# Patient Record
Sex: MALE | Race: WHITE | URBAN - METROPOLITAN AREA
[De-identification: names, ages, dates, MRNs, and addresses within clinical notes are randomized per-mention and may not be internally consistent; named-entity substitution may affect disease eponyms.]

---

## 2017-12-07 ENCOUNTER — HISTORICAL (OUTPATIENT)
Dept: CARDIOLOGY | Facility: HOSPITAL | Age: 62
End: 2017-12-07

## 2018-12-14 ENCOUNTER — HISTORICAL (OUTPATIENT)
Dept: LAB | Facility: HOSPITAL | Age: 63
End: 2018-12-14

## 2018-12-14 LAB
ABS NEUT (OLG): 4.6 X10(3)/MCL (ref 2.1–9.2)
APTT PPP: 28.3 SECOND(S) (ref 24.8–36.9)
ERYTHROCYTE [DISTWIDTH] IN BLOOD BY AUTOMATED COUNT: 12.3 % (ref 11.5–17)
HCT VFR BLD AUTO: 46.2 % (ref 42–52)
HGB BLD-MCNC: 16 GM/DL (ref 14–18)
INR PPP: 1.1 (ref 0–1.3)
MCH RBC QN AUTO: 31.3 PG (ref 27–31)
MCHC RBC AUTO-ENTMCNC: 34.6 GM/DL (ref 33–36)
MCV RBC AUTO: 90.4 FL (ref 80–94)
PLATELET # BLD AUTO: 189 X10(3)/MCL (ref 130–400)
PMV BLD AUTO: 9.8 FL (ref 7.4–10.4)
PROTHROMBIN TIME: 14.1 SECOND(S) (ref 12.2–14.7)
RBC # BLD AUTO: 5.11 X10(6)/MCL (ref 4.7–6.1)
WBC # SPEC AUTO: 7.9 X10(3)/MCL (ref 4.5–11.5)

## 2018-12-18 ENCOUNTER — HISTORICAL (OUTPATIENT)
Dept: CARDIOLOGY | Facility: HOSPITAL | Age: 63
End: 2018-12-18

## 2020-01-02 LAB
INFLUENZA A ANTIGEN, POC: NEGATIVE
INFLUENZA B ANTIGEN, POC: NEGATIVE

## 2020-01-07 LAB — RAPID GROUP A STREP (OHS): NEGATIVE

## 2021-01-04 ENCOUNTER — HISTORICAL (OUTPATIENT)
Dept: CARDIOLOGY | Facility: HOSPITAL | Age: 66
End: 2021-01-04

## 2021-01-04 LAB
ABS NEUT (OLG): 4.42 X10(3)/MCL (ref 2.1–9.2)
ALBUMIN SERPL-MCNC: 4.1 GM/DL (ref 3.4–4.8)
ALBUMIN/GLOB SERPL: 1.9 RATIO (ref 1.1–2)
ALP SERPL-CCNC: 62 UNIT/L (ref 40–150)
ALT SERPL-CCNC: 16 UNIT/L (ref 0–55)
APTT PPP: 28.5 SECOND(S) (ref 23.2–33.7)
AST SERPL-CCNC: 21 UNIT/L (ref 5–34)
BASOPHILS # BLD AUTO: 0.1 X10(3)/MCL (ref 0–0.2)
BASOPHILS NFR BLD AUTO: 1 %
BILIRUB SERPL-MCNC: 0.6 MG/DL
BILIRUBIN DIRECT+TOT PNL SERPL-MCNC: 0.2 MG/DL (ref 0–0.5)
BILIRUBIN DIRECT+TOT PNL SERPL-MCNC: 0.4 MG/DL (ref 0–0.8)
BUN SERPL-MCNC: 11.8 MG/DL (ref 8.4–25.7)
CALCIUM SERPL-MCNC: 8.9 MG/DL (ref 8.8–10)
CHLORIDE SERPL-SCNC: 100 MMOL/L (ref 98–107)
CO2 SERPL-SCNC: 29 MMOL/L (ref 23–31)
CREAT SERPL-MCNC: 0.94 MG/DL (ref 0.73–1.18)
EOSINOPHIL # BLD AUTO: 0.3 X10(3)/MCL (ref 0–0.9)
EOSINOPHIL NFR BLD AUTO: 5 %
ERYTHROCYTE [DISTWIDTH] IN BLOOD BY AUTOMATED COUNT: 11.9 % (ref 11.5–17)
GLOBULIN SER-MCNC: 2.2 GM/DL (ref 2.4–3.5)
GLUCOSE SERPL-MCNC: 90 MG/DL (ref 82–115)
HCT VFR BLD AUTO: 43.4 % (ref 42–52)
HGB BLD-MCNC: 14.5 GM/DL (ref 14–18)
INR PPP: 1.1 (ref 0–1.3)
LYMPHOCYTES # BLD AUTO: 2 X10(3)/MCL (ref 0.6–4.6)
LYMPHOCYTES NFR BLD AUTO: 28 %
MAGNESIUM SERPL-MCNC: 1.9 MG/DL (ref 1.6–2.6)
MCH RBC QN AUTO: 31 PG (ref 27–31)
MCHC RBC AUTO-ENTMCNC: 33.4 GM/DL (ref 33–36)
MCV RBC AUTO: 92.9 FL (ref 80–94)
MONOCYTES # BLD AUTO: 0.5 X10(3)/MCL (ref 0.1–1.3)
MONOCYTES NFR BLD AUTO: 7 %
NEUTROPHILS # BLD AUTO: 4.42 X10(3)/MCL (ref 2.1–9.2)
NEUTROPHILS NFR BLD AUTO: 60 %
PLATELET # BLD AUTO: 166 X10(3)/MCL (ref 130–400)
PMV BLD AUTO: 10.7 FL (ref 9.4–12.4)
POC TROPONIN: 0 NG/ML (ref 0–0.08)
POTASSIUM SERPL-SCNC: 3.9 MMOL/L (ref 3.5–5.1)
PROT SERPL-MCNC: 6.3 GM/DL (ref 5.8–7.6)
PROTHROMBIN TIME: 13.5 SECOND(S) (ref 11.1–13.7)
RBC # BLD AUTO: 4.67 X10(6)/MCL (ref 4.7–6.1)
SODIUM SERPL-SCNC: 140 MMOL/L (ref 136–145)
TROPONIN I SERPL-MCNC: <0.01 NG/ML (ref 0–0.04)
WBC # SPEC AUTO: 7.4 X10(3)/MCL (ref 4.5–11.5)

## 2021-08-17 LAB
BUN SERPL-MCNC: 25.4 MG/DL (ref 8.4–25.7)
CALCIUM SERPL-MCNC: 9.6 MG/DL (ref 8.8–10)
CHLORIDE SERPL-SCNC: 103 MMOL/L (ref 98–107)
CO2 SERPL-SCNC: 27 MMOL/L (ref 23–31)
CREAT SERPL-MCNC: 1.15 MG/DL (ref 0.73–1.18)
CREAT/UREA NIT SERPL: 22
ERYTHROCYTE [DISTWIDTH] IN BLOOD BY AUTOMATED COUNT: 12 % (ref 11.5–17)
GLUCOSE SERPL-MCNC: 86 MG/DL (ref 82–115)
HCT VFR BLD AUTO: 42.1 % (ref 42–52)
HGB BLD-MCNC: 14.4 GM/DL (ref 14–18)
MCH RBC QN AUTO: 31 PG (ref 27–31)
MCHC RBC AUTO-ENTMCNC: 34.2 GM/DL (ref 33–36)
MCV RBC AUTO: 90.7 FL (ref 80–94)
PLATELET # BLD AUTO: 167 X10(3)/MCL (ref 130–400)
PMV BLD AUTO: 11.1 FL (ref 9.4–12.4)
POTASSIUM SERPL-SCNC: 4.4 MMOL/L (ref 3.5–5.1)
RBC # BLD AUTO: 4.64 X10(6)/MCL (ref 4.7–6.1)
SODIUM SERPL-SCNC: 140 MMOL/L (ref 136–145)
WBC # SPEC AUTO: 6.3 X10(3)/MCL (ref 4.5–11.5)

## 2021-08-25 LAB — SARS-COV-2 AG RESP QL IA.RAPID: NEGATIVE

## 2021-08-26 ENCOUNTER — HISTORICAL (OUTPATIENT)
Dept: CARDIOLOGY | Facility: HOSPITAL | Age: 66
End: 2021-08-26

## 2021-08-26 LAB
EST CREAT CLEARANCE SER (OHS): 56.5 ML/MIN
INR PPP: 1.1 (ref 0–1.3)
MAGNESIUM SERPL-MCNC: 2.1 MG/DL (ref 1.6–2.6)
PROTHROMBIN TIME: 13.8 SECOND(S) (ref 12.5–14.5)

## 2022-04-10 ENCOUNTER — HISTORICAL (OUTPATIENT)
Dept: ADMINISTRATIVE | Facility: HOSPITAL | Age: 67
End: 2022-04-10

## 2022-04-29 VITALS
BODY MASS INDEX: 22.5 KG/M2 | WEIGHT: 140 LBS | SYSTOLIC BLOOD PRESSURE: 107 MMHG | HEIGHT: 66 IN | OXYGEN SATURATION: 96 % | DIASTOLIC BLOOD PRESSURE: 73 MMHG

## 2022-04-30 NOTE — OP NOTE
Patient:   Pavel Norman            MRN: [id number]            FIN: [id number]               Age:   62 years     Sex:  Male     :  1955   Associated Diagnoses:   None   Author:   Ananth Be MD      Operative Note   Operative Information   Date/ Time:  2017 09:21:00.     Procedures Performed:   1.  Comprehensive EP study with induction of arrhythmia  2.  Left atrial pacing and recording from coronary sinus catheter  3.  Programmed stimulation and pacing after IV drug infusion  4.  Ablation of the cava tricuspid isthmus for typical atrial flutter  5.  3-D mapping  .     Indications:   62-year-old white male with recurrent atrial flutter on antiarrhythmic therapy who has also been found to have sinus node dysfunction.  Patient is undergoing attempted ablation for definitive therapy in order to attempt to avoid antiarrhythmic medications.  .     Preoperative Diagnosis:   1.  Typical atrial flutter  2.  Left bundle-branch block  3.  Sinus node dysfunction  .     Postoperative Diagnosis:   1.  Typical atrial flutter  2.  Left bundle-branch block  3.  Sinus node dysfunction  .     Surgeon: Ananth Be MD.     Esimated blood loss: loss greater than  0  cc, loss less than  10  cc.     Description of Procedure/Findings/    Complications:   Summary of procedure:  After risks, benefits, and alternatives were explained to the patient, informed consent was obtained.  The patient was brought to the EP lab in a fasting and nonsedated state.  Sedation for the procedure was accomplished by the anesthesia team.  The bilateral groins were prepped and draped in usual sterile fashion.  Using 1% lidocaine the right groin was anesthetized.  Using the modified Seldinger technique access was obtained to the right femoral vein on 3 separate occasions where 2 7 Georgian and one 6 Georgian sheath were placed.  I then advanced a SeeJay decapolar CS catheter through the 1st 7 Georgian sheath to the heart and into the  coronary sinus for left atrial pacing and recording.  I then advanced a Halo catheter and a CRD 2 catheter through the 7 Czech and 6 Czech sheaths respectively.  The Halo catheter was placed in the right atrium along the doug terminalis for right atrial pacing and recording.  The CRD 2 catheter was placed in the his bundle region for His bundle recording.    Baseline measurements were obtained.  I then proceeded with the atrial study where we were performing decremental pacing.  With decremental pacing the patient did initiate into a counterclockwise activating flutter with concentric activation of the left atrium.  Baseline measurements of atrial flutter were measured.  I then proceeded with entrainment from the distal Halo catheter and the proximal coronary sinus.  There was concealed entrainment with pacing from both locations however the post-pacing interval was slightly prolonged.  I also performed pacing from the distal CS catheter which demonstrated manifest entrainment and with termination of pacing the post-pacing interval was clearly outside of the tachycardia circuit.  I then mapped the right atrium using the Halo catheter and created a propagation map which was consistent with likely typical atrial flutter.    The CRD 2 catheter was removed from the body and the 6 Czech sheath was exchanged for an SRO sheath.  I then advanced a [place] flexibility 4 mm tip F-J curve irrigated ablation catheter through the SRO sheath.  The ablation catheter was then placed in the distal aspect of the cava tricuspid isthmus and I performed drag line ablation lesions through the cava tricuspid isthmus.  When we're at the proximal to mid aspect of the cava tricuspid isthmus there was termination of the atrial flutter.  I then performed pacing from the proximal point sinus which demonstrated delayed but continued conduction through the cava tricuspid isthmus.  Further ablation was performed in the area where we had  terminated the flutter and the line was completed to the inferior vena cava.  When ablating slightly more proximal to the area where we had termination of the flutter we did achieve unidirectional block.  I then performed pacing from the distal Halo catheter and confirmed bidirectional block.  I then repositioned the ablation catheter distally on this previous line and performed ablation in areas where there were continued adequate atrial signals.  I also performed a consolidating lesion where we achieved block.    The ablation catheter was then exchanged for the CRD 2 catheter, which was placed back in the his bundle region.  Post-ablation measurements were then obtained.  I then performed the post-ablation atrial study with burst pacing, decremental pacing, and atrial extra stimuli.  After 15 minutes from a last ablation lesion the patient was given 12 mg of IV adenosine to hyper-depolarize the atrial tissue and evaluate for reconnection of conductionthrough the isthmus.  There is no evidence of reconnection with pacing from the proximal coronary sinus and distal Halo.  The patient maintained bidirectional block.    At this point I decided to terminate the procedure.  The catheters were removed from the body and the SRO sheath was exchanged for a short 9 Singaporean sheath.  The sheath were flushed.  The patient will be transferred to the post anesthesia care unit where the sheaths will be removed and hemostasis will be obtained with manual compression.    3-D mapping was used through the procedure to sena location of the catheters, the ablation lesions, create anatomy of the right atrium and cava tricuspid isthmus and to create a propagation map.    Baseline measurements:  Sinus node: Sinus cycle length was 886 ms  AV node: WI interval was 211 ms, AH interval was 110 ms, the HV interval was 75 ms  Ventricle: The QRS duration was 125 ms with a left bundle-branch block morphology and the QT interval was 450 ms    Atrial  flutter:  The atrial flutter had counterclockwise activation of the right atrium and concentric activation of the left atrium with a cycle length of 450 ms.  When we performed entrainment from the proximal coronary sinus as well as the distal Halo catheter there was concealed entrainment and with termination of pacing the post-pacing interval was 510 ms.  When I performed entrainment from the distal CS catheter there was manifest entrainment and with termination of pacing the post-pacing interval was 690 ms.    Ablation:  I performed 3 ablation lesions foretold time of 7 minutes and 30 seconds.  The average temperature was 26°C and the average power was 29 Harmon.    Post-ablation study:  Baseline measurements:  Sinus node: The sinus cycle length was 935 ms  AV node: The HI interval was 200 ms, the AH interval was 111 ms, and HV interval was 72 ms  Ventricle: The QRS duration was 125 ms with a left bundle-branch block morphology and the QT interval was 476 ms    Atrial study: The AV block cycle length was 370 ms and the atrial ERP was 270 ms after drive train of 600 ms.    Trans-isthmus conduction: The post-ablation trans-isthmus conduction time with pacing from the proximal coronary sinus to the distal Halo catheter was 232 ms and with pacing from the distal Halo catheter to the proximal coronary sinus was 235 ms.  There was positive differential pacing as well as one I paced from Halo catheter 3, 4 the trans-isthmus conduction time to the proximal coronary sinus was 212 ms.    Impression:  1.  Typical atrial flutter  2.  Successful ablation of the cava tricuspid isthmus with termination of atrial flutter and achievement of bidirectional block  3.  Left bundle-branch block with prolonged H HV interval    Plan:  I am going to discontinue flecainide therapy.  We will monitor patient for any competitions from the procedure and he will be discharged home later on today.  I am going to continue Toprol therapy for now.   Patient will follow-up with me as precede discussed and we will arrange Holter monitoring at follow-up..     Complications: None.

## 2022-04-30 NOTE — ED PROVIDER NOTES
Patient:   Pavel Norman            MRN: [id number]            FIN: [id number]               Age:   65 years     Sex:  Male     :  1955   Associated Diagnoses:   Arrhythmia; Bradycardia   Author:   Jazmyn Araujo MD, Nolan BUCHANAN      Basic Information   Time seen: Date & time 2021 11:40:00.   History source: Patient.   Arrival mode: Private vehicle.   Additional information: Patient's physician(s): Reyna Barth MD, Chief Complaint from Nursing Triage Note : Chief Complaint   2021 11:30 CST       Chief Complaint           pt states he has loop recorder and [facility] instructe him to come to ED. Had 13 second pause on loop recorder. reports low BP and HR in 130s on saturday. Denies dizziness, cp, sob.  .      History of Present Illness   The patient presents with   65 year old male presents to ED for loop recorder problem-reports having 13 second pause yesterday with low blood pressure and elevated heart rate; Was told to report to ED by [facility]; Denies dizziness, chest pain, shortness of breath - TRACY Garcia  and I, Dr. Eldridge, assumed care of this patient at 1208.     66 y/o CM with hx of AF and a loop recorder presents to the ED after being called by [facility] for episode of asystole on Saturday. PT reports that they received the reading that showed asystole for 13 seconds. PT reports reports having AF and hypotension on Saturday with exertion, therefore he rested. He denies having any symptoms currently. .  The onset was 2  days ago.  Associated symptoms: none.  Risk factors consist of atrial fibrillation.  Prior episodes: none.  Therapy today: none.  Additional history: none.        Review of Systems   Constitutional symptoms:  No fever, no chills, no sweats, no weakness   Skin symptoms:  No rash   Eye symptoms:  No recent vision problems   ENMT symptoms:  No ear pain   Respiratory symptoms:  No shortness of breath, no orthopnea   Cardiovascular symptoms:  No chest pain, no palpitations    Gastrointestinal symptoms:  No abdominal pain, no nausea, no vomiting, no diarrhea   Genitourinary symptoms:  No dysuria, no hematuria.    Musculoskeletal symptoms:  No back pain, no Muscle pain   Psychiatric symptoms:  No anxiety, no depression.    Allergy/immunologic symptoms:  No seasonal allergies, no food allergies             Additional review of systems information: All other systems reviewed and otherwise negative.      Health Status   Allergies:    Allergic Reactions (Selected)  No Known Allergies,    Allergies (1) Active Reaction  No Known Allergies None Documented  .   Medications:  (Selected)   Documented Medications  Documented  LISINOPRIL 20 MG TABLET: 20 mg = 1 tab(s), Oral, Daily  Synthroid 100 mcg (0.1 mg) oral tablet: 100 mcg = 1 tab(s), Oral, Daily, 0 Refill(s)  aspirin 325 mg oral tablet: 325 mg = 1 tab(s), Oral, Daily  metoprolol succinate 25 mg oral tablet extended release: 12.5 mg = 0.5 tab(s), Oral, qPM.      Past Medical/ Family/ Social History   Medical history:    Active  arrythmia (5301303669)  Palpitations (692923551)  pre syncope (3582837321)  Thyroid disease (318687172)  AF - Atrial fibrillation (7003046631)  Resolved  Colon polyps ([id number]):  Resolved.  Comments:  5/8/2014 CDT 11:06 CDT - Mitch BOWEN, Martina kaufman  Atrial flutter (6302827):  Resolved.  HTN - Hypertension (7301788043):  Resolved..   Surgical history:    left inguinal hernia repair.  finger surgeries.  Kidney stone (739609513).  Vasectomy (63668523)..   Family history:    No family history items have been selected or recorded..   Social history: Alcohol use: Denies, Tobacco use: Denies.      Physical Examination               Vital Signs   Vital Signs   1/4/2021 11:30 CST       Temperature Oral          36.7 DegC                             Temperature Oral (calculated)             98.06 DegF                             Peripheral Pulse Rate     54 bpm  LOW                              Respiratory Rate          18 br/min                             SpO2                      100 %                             Oxygen Therapy            Room air                             Systolic Blood Pressure   169 mmHg  HI                             Diastolic Blood Pressure  97 mmHg  HI  .      Vital Signs (last 24 hrs)_____  Last Charted___________  Temp Oral     36.7 DegC  (JAN 04 11:30)  Heart Rate Peripheral   L 54bpm  (JAN 04 11:30)  Resp Rate         18 br/min  (JAN 04 11:30)  SBP      H 169mmHg  (JAN 04 11:30)  DBP      H 97mmHg  (JAN 04 11:30)  SpO2      100 %  (JAN 04 11:30)  .   Measurements   1/4/2021 11:30 CST       Weight Dosing             61.5 kg                             Weight Measured and Calculated in Lbs     135.58 lb                             Weight Estimated          61.5 kg                             Height/Length Dosing      167 cm                             Height/Length Estimated   167 cm                             Body Mass Index Estimated 22.05 kg/m2  .   Basic Oxygen Information   1/4/2021 11:30 CST       SpO2                      100 %                             Oxygen Therapy            Room air  .   General:  Alert, no acute distress   Neurological:  Alert and oriented to person, place, time, and situation, No focal neurological deficit observed, CN II-XII intact, normal sensory observed, normal motor observed, normal speech observed   Skin:  Warm, pink, intact, moist, no rash   Head:  Normocephalic, atraumatic   Cardiovascular:  No murmur, Normal peripheral perfusion, No edema, Bradycardia   Respiratory:  Lungs are clear to auscultation, respirations are non-labored, breath sounds are equal, Symmetrical chest wall expansion.    Musculoskeletal:  Normal ROM, normal strength   Gastrointestinal:  Soft, Nontender, Non distended, Normal bowel sounds   , No CVA tenderness. Lymphatics:  No lymphadenopathy.   Psychiatric:  Cooperative, appropriate mood & affect, normal judgment       Medical Decision Making   Rationale:  65-year-old male being closely followed by [facility] for arrhythmias.  Patient had a loop recorder on and had an episode occur early  morning.  Patient was notified this morning to come to the ER immediately.  Patient is currently asymptomatic.  According to [facility] he had a 13-second pause of his pulse therefore they are taking him to the Cath Lab at this time for a pacemaker..   Documents reviewed:  Emergency department nurses' notes.   Orders  Launch Order Profile (Selected)   Inpatient Orders  Ordered  Echocardiogram Limited Adult: 21 12:22:00 CST, Stat, arrhythmia, Stop date 21 12:22:00 CST, Patient Bed, Patient has IV, Standard Precautions  Ordered (Collected)  POC iSTAT ER Troponin request: BLOOD, STAT collect, Collected, 21 11:47:45 CST, Stop date 21 11:47:00 CST, Lab Collect, Print Label By Order Location  Ordered (In-Lab)  CMP: STAT collect, 21 11:47:45 CST, BLOOD, Collected, Stop date 21 11:47:00 CST, Lab Collect  Mg Level: STAT collect, 21 11:47:45 CST, BLOOD, Collected, Stop date 21 11:47:00 CST, Lab Collect  PT (Protime): STAT collect, 21 11:47:45 CST, BLOOD, Collected, Stop date 21 11:47:00 CST, Lab Collect  PTT: STAT collect, 21 11:47:45 CST, BLOOD, Collected, Stop date 21 11:47:00 CST, Lab Collect  Troponin-I: STAT collect, 21 11:47:45 CST, BLOOD, Collected, Stop date 21 11:47:00 CST, Lab Collect  Completed  Automated Diff: NOW collect, 21 11:47:00 CST, Blood, Collected, Stop date 21 11:47:00 CST, Lab Collect, Print Label By Order Location, 21 11:41:00 CST  CBC w/ Auto Diff: NOW collect, 21 11:47:45 CST, BLOOD, Collected, Stop date 21 11:47:00 CST, Lab Collect  EK21 11:41:00 CST, Stat, Once, 21 11:41:00 CST, Patient Bed, Patient Has IV, Standard Precautions, -1, -1, 21 11:41:00 CST  POC Istat ER Troponin: Blood, Stat collect,  Collected, 01/04/21 12:08:42 CST  XR Chest 2 Views: Stat, 01/04/21 11:41:00 CST, Chest Pain, None, Patient Bed, Patient Has IV?, Rad Type, Not Scheduled, 01/04/21 11:41:00 CST.   Electrocardiogram:  Time 1/4/2021 11:30:00, rate 46, No ST-T changes, no ectopy, normal LA & QRS intervals, EP Interp, The Rhythm is sinus bradycardia.  , septal infarct, age undetermined.    Results review:  Lab results : Lab View   1/4/2021 12:29 CST       Est Creat Clearance Ser   70.06 mL/min    1/4/2021 12:08 CST       POC Troponin              0.00 ng/mL    1/4/2021 11:47 CST       Sodium Lvl                140 mmol/L                             Potassium Lvl             3.9 mmol/L                             Chloride                  100 mmol/L                             CO2                       29 mmol/L                             Calcium Lvl               8.9 mg/dL                             Magnesium Lvl             1.90 mg/dL                             Glucose Lvl               90 mg/dL                             BUN                       11.8 mg/dL                             Creatinine                0.94 mg/dL                             eGFR-AA                   >60  NA                             eGFR-CHALO                  >60 mL/min/1.73 m2  NA                             Bili Total                0.6 mg/dL                             Bili Direct               0.2 mg/dL                             Bili Indirect             0.40 mg/dL                             AST                       21 unit/L                             ALT                       16 unit/L                             Alk Phos                  62 unit/L                             Total Protein             6.3 gm/dL                             Albumin Lvl               4.1 gm/dL                             Globulin                  2.2 gm/dL  LOW                             A/G Ratio                 1.9 ratio                             Troponin-I                 <0.010 ng/mL                             PT                        13.5 second(s)                             INR                       1.1                             PTT                       28.5 second(s)                             WBC                       7.4 x10(3)/mcL                             RBC                       4.67 x10(6)/mcL  LOW                             Hgb                       14.5 gm/dL                             Hct                       43.4 %                             Platelet                  166 x10(3)/mcL                             MCV                       92.9 fL                             MCH                       31.0 pg                             MCHC                      33.4 gm/dL                             RDW                       11.9 %                             MPV                       10.7 fL                             Abs Neut                  4.42 x10(3)/mcL                             Neutro Auto               60 %  NA                             Lymph Auto                28 %  NA                             Mono Auto                 7 %  NA                             Eos Auto                  5 %  NA                             Abs Eos                   0.3 x10(3)/mcL                             Basophil Auto             1 %  NA                             Abs Neutro                4.42 x10(3)/mcL                             Abs Lymph                 2.0 x10(3)/mcL                             Abs Mono                  0.5 x10(3)/mcL                             Abs Baso                  0.1 x10(3)/mcL  ,    Labs (Last four charted values)  WBC                  7.4 (JAN 04)   Hgb                  14.5 (JAN 04)   Hct                  43.4 (JAN 04)   Plt                  166 (JAN 04)   Na                   140 (JAN 04)   K                    3.9 (JAN 04)   CO2                  29 (JAN 04)   Cl                   100 (JAN 04)   Cr                    0.94 (JAN 04)   BUN                  11.8 (JAN 04)   Glucose Random       90 (JAN 04)   PT                   13.5 (JAN 04)   INR                  1.1 (JAN 04)   PTT                  28.5 (JAN 04) .    Radiology results:  Rad Results (ST)  < 12 hrs   Accession: [id number]  Order: XR Chest 2 Views  Report Dt/Tm: 01/04/2021 12:01  Report:      CHEST X-RAYS (2 Views):     CPT: 18915     HISTORY:  Chest Pain     FINDINGS:  Examination reveals mediastinal and cardiac silhouettes to be within  normal limits. Lung fields are clear and free of gross infiltrates  atelectases or effusions.     Loop recorder is identified     IMPRESSION: No active pulmonary disease    .      Reexamination/ Reevaluation   Vital signs   Basic Oxygen Information   1/4/2021 11:30 CST       SpO2                      100 %                             Oxygen Therapy            Room air        Impression and Plan   Diagnosis   Arrhythmia (UOC30-RY I49.9)   Bradycardia (JXH86-EH R00.1)      Calls-Consults   -  1/4/2021 12:12:00 , Dago Silva DO, consult, Consulted in ED. Will text the group for more information..    Plan   Condition: Guarded.    Disposition: Admit time  1/4/2021 12:59:00, Admit to Inpatient Unit, Patient care transitioned to: Dago Silva DO    Counseled: Patient, Regarding diagnosis, Regarding diagnostic results, Regarding treatment plan, Patient indicated understanding of instructions.    Notes: Julisa LUONG, acted solely as a scribe for and in the presence of Dr. Eldridge who performed the service. , Nolan LUONG, personally performed H&P, PE, and MDM and cofirmed the accuracy of the transcribed note..

## 2022-04-30 NOTE — OP NOTE
Patient:   Pavel Norman            MRN: [id number]            FIN: [id number]               Age:   66 years     Sex:  Male     :  1955   Associated Diagnoses:   None   Author:   Ananth Be MD      Operative Note   Operative Information   Date/ Time:  2021 10:54:00.     Procedures Performed:   1. Comprehensive EP study with attempted induction of arrhythmia  2. Left atrial pacing and recording from coronary sinus catheter  3. Pulmonary vein isolation with cryoballoon ablation  4. 3D mapping  5. Intracardiac echo  6. Transseptal puncture  7. Ultrasound-guided central venous access  8. Venous access closure with Vascade MVP devices.     Indications:   66-year-old white male with paroxysmal atrial fibrillation who is undergoing pulmonary vein isolation for attempted definitive therapy.  Morning.     Preoperative Diagnosis:   Paroxysmal atrial fibrillation  .     Postoperative Diagnosis:   Paroxysmal atrial fibrillation  .     Surgeon: Ananth Be MD.     Esimated blood loss: loss less than  15  cc.     Description of Procedure/Findings/    Complications:   Summary of procedure:  After risks, benefits, and alternatives were explained the patient, informed consent was obtained.  The patient brought to the EP lab in a fasting and nonsedated state.  Sedation for the procedure was accomplished by the anesthesia team.  The bilateral groins were prepped and draped in usual sterile fashion.  Using 1% lidocaine the bilateral groins were anesthetized.  Using ultrasound guidance and the modified Seldinger technique, access obtained to the right femoral vein on 2 separate occasions and the left femoral vein on 2 separate occasions.  I placed a SLO sheath and 7 [place]i sheath into the right femoral vein as well as a 7 [place]i sheath and long 10 [place]i sheath into the left femoral vein.  I then advanced a enrich-in decapolar CS catheter through the first 7 [place]i sheath and placed it into the coronary  sinus for left atrial pacing and recording.  I then advanced a deflectable quadripolar catheter and the intracardiac echo catheter into the right atrium.  The intracardiac echo catheter was used to visualize the interatrial septum and left atrium.  The quadripolar catheter was placed in the His bundle region for His bundle recording.  Baseline measurements were obtained.    I then advanced a Lester transseptal needle through the SLO sheath and dilator and with the use of fluoroscopic and intracardiac echo guidance a single transseptal puncture was performed using a single radiofrequency impulse.  Successful puncture was confirmed on intracardiac echo.  The SLO sheath was advanced over the needle and dilator into left atrium and the needle and dilator removed in the body.  A left atrial mean pressure of 13 mmHg was measured.    Of note, after all venous access have been obtained the patient received a 3000 unit heparin bolus.  After successful transseptal puncture the patient received another 4000 unit heparin bolus and was started heparin drip.  The heparin was titrated throughout the procedure for goal ACT of 300 to 400 seconds.    I then advanced a Lester pigtail wire through the SLO sheath into left atrium and exchanged the SLO sheath for a short 14 Czech sheath which was used to dilate the groin tissue.  The 14 Czech sheath was then exchanged for a flex cath sheath which was advanced over the pigtail wire into the left atrium.  The wire and dilator were then removed from the body.  The achieve wire and cryoballoon catheter were then advanced through the flex cath sheath into the left atrium.  The achieve wire and cryoballoon catheter were then directed to the left superior pulmonary vein where I performed a single lesion of 180 seconds with a helena temperature of -51 °C.  Isolation was achieved at 60 seconds with temperature -43 °C.  The achieve wire and cryoballoon catheter were then directed to the left  inferior pulmonary vein were performed initial lesion of 48 seconds with a helena temperature -26 °C.  A second lesion was performed at 150 seconds with a helena temperature -42 °C.  A third lesion was performed of 150 seconds with a helena temperature -51 °C.  This lesion was stopped due to lower esophageal temperatures.  Isolation the vein was confirmed with voltage mapping.  The achieve wire and cryoballoon catheter were then directed to the right superior pulmonary vein where I performed 2 lesions with the first being 150 seconds with a helena temperature -54 °C.  The second lesion was 180 seconds with a helena temperature -54 °C and isolation was achieved at 15 seconds with a temperature -22 °C.  The achieve wire and cryocatheter were then directed to the right inferior pulmonary vein were performed a single lesion of 180 seconds with a helena temperature -51 °C.  The achieve wire was then redirected to all 4 pulmonary veins and isolation was confirmed with voltage mapping.    The achieve wire and cryoballoon catheter were then removed from the body and the flex cath sheath was withdrawn into the inferior vena cava.  The heparin drip was discontinued.    Of note, prior to performing ablation to the right-sided pulmonary veins the quadripolar catheter was advanced to the junction of the superior vena cava and right subclavian vein where I performed high-output pacing of the phrenic nerve to evaluate for diaphragmatic contraction.  There is no loss of diaphragmatic contraction with any of the right-sided pulmonary vein ablation lesions.  Also of note, esophageal temperatures were monitored throughout the procedure.    The quadripolar catheter was then repositioned in the His bundle region and post ablation measurements were obtained.  I then performed a post ablation atrial study with burst pacing, decremental pacing, and atrial stimuli.    At this point decided terminate the procedure.  The catheters were removed the  body and the sheaths were flushed.  Prior to removing the intracardiac echo catheter it was advanced into the right ventricle to visualize for pericardial effusion.  There was no significant pericardial effusion found.  The flex cath sheath was then exchanged for a short 12 Faroese sheath and the long 10 Faroese sheath was exchanged for a short 10 Faroese sheath.  The patient was then given 50 mg of IV protamine for reversal heparinization.  All the sheaths were removed the body and the venous access was closed using Vascade MVP closure devices at all 4 access sites.  Manual compression was performed and hemostasis was confirmed.    The patient be transferred to the post anesthesia care unit and then to [facility] stay for continued monitoring.    3D mapping was used throughout the procedure to create anatomy and voltage mapping of the left atrium, left atrial Penders, and pulmonary veins.    Baseline measurements:  Sinus node: The patient was being atrially paced at 1000 ms  AV node: The AH interval was 100 lev milliseconds and the HV was 50 ms  Ventricle: The QRS duration was 90 ms and the QT was 461 ms    Post ablation measurements:  Sinus node: The patient was being atrially paced at 1000 ms.   AV node: The AH interval 76 ms and the HV was 55 ms  Ventricle: The QRS duration was 102 ms and the QT was 489 ms    Impression:  1.  Paroxysmal atrial fibrillation  2.  Pulmonary vein isolation with cryoballoon ablation  3.  Sick sinus syndrome status post permanent pacemaker  4.  Venous access closure with Vascade MVP devices    Plan:  We will monitor patient for complications with the procedure and he will discharge home later on today.  I am continue with sotalol therapy and will have patient resume Eliquis therapy this evening.  Patient will follow up with me as previously scheduled..     Complications: None.

## 2022-09-15 ENCOUNTER — HISTORICAL (OUTPATIENT)
Dept: ADMINISTRATIVE | Facility: HOSPITAL | Age: 67
End: 2022-09-15